# Patient Record
Sex: FEMALE | Race: WHITE | NOT HISPANIC OR LATINO | Employment: FULL TIME | URBAN - METROPOLITAN AREA
[De-identification: names, ages, dates, MRNs, and addresses within clinical notes are randomized per-mention and may not be internally consistent; named-entity substitution may affect disease eponyms.]

---

## 2021-07-12 ENCOUNTER — OFFICE VISIT (OUTPATIENT)
Dept: FAMILY MEDICINE | Facility: CLINIC | Age: 31
End: 2021-07-12
Payer: COMMERCIAL

## 2021-07-12 VITALS
HEIGHT: 66 IN | SYSTOLIC BLOOD PRESSURE: 130 MMHG | BODY MASS INDEX: 32.71 KG/M2 | OXYGEN SATURATION: 99 % | HEART RATE: 100 BPM | DIASTOLIC BLOOD PRESSURE: 85 MMHG | WEIGHT: 203.5 LBS | TEMPERATURE: 98 F

## 2021-07-12 DIAGNOSIS — Z13.6 SCREENING FOR HEART DISEASE: ICD-10-CM

## 2021-07-12 DIAGNOSIS — Z13.1 SCREENING FOR DIABETES MELLITUS: ICD-10-CM

## 2021-07-12 DIAGNOSIS — Z00.00 ANNUAL PHYSICAL EXAM: Primary | ICD-10-CM

## 2021-07-12 DIAGNOSIS — R53.83 OTHER FATIGUE: ICD-10-CM

## 2021-07-12 DIAGNOSIS — Z12.4 SCREENING FOR MALIGNANT NEOPLASM OF CERVIX: ICD-10-CM

## 2021-07-12 DIAGNOSIS — R63.5 WEIGHT GAIN: ICD-10-CM

## 2021-07-12 PROCEDURE — 99385 PREV VISIT NEW AGE 18-39: CPT | Performed by: NURSE PRACTITIONER

## 2021-07-12 PROCEDURE — G0145 SCR C/V CYTO,THINLAYER,RESCR: HCPCS | Performed by: NURSE PRACTITIONER

## 2021-07-12 PROCEDURE — 87624 HPV HI-RISK TYP POOLED RSLT: CPT | Performed by: NURSE PRACTITIONER

## 2021-07-12 RX ORDER — VENLAFAXINE HYDROCHLORIDE 150 MG/1
150 TABLET, EXTENDED RELEASE ORAL DAILY
COMMUNITY
End: 2022-02-11

## 2021-07-12 RX ORDER — VENLAFAXINE HYDROCHLORIDE 37.5 MG/1
37.5 CAPSULE, EXTENDED RELEASE ORAL PRN
COMMUNITY
Start: 2021-07-07 | End: 2022-02-11

## 2021-07-12 RX ORDER — CLONAZEPAM 0.5 MG/1
0.5 TABLET ORAL 2 TIMES DAILY PRN
COMMUNITY

## 2021-07-12 RX ORDER — CETIRIZINE HYDROCHLORIDE 10 MG/1
10 TABLET ORAL DAILY
COMMUNITY
End: 2022-02-11

## 2021-07-12 ASSESSMENT — PAIN SCALES - GENERAL: PAINLEVEL: NO PAIN (0)

## 2021-07-12 ASSESSMENT — ANXIETY QUESTIONNAIRES
IF YOU CHECKED OFF ANY PROBLEMS ON THIS QUESTIONNAIRE, HOW DIFFICULT HAVE THESE PROBLEMS MADE IT FOR YOU TO DO YOUR WORK, TAKE CARE OF THINGS AT HOME, OR GET ALONG WITH OTHER PEOPLE: SOMEWHAT DIFFICULT
GAD7 TOTAL SCORE: 2
6. BECOMING EASILY ANNOYED OR IRRITABLE: NOT AT ALL
3. WORRYING TOO MUCH ABOUT DIFFERENT THINGS: NOT AT ALL
2. NOT BEING ABLE TO STOP OR CONTROL WORRYING: NOT AT ALL
7. FEELING AFRAID AS IF SOMETHING AWFUL MIGHT HAPPEN: NOT AT ALL
5. BEING SO RESTLESS THAT IT IS HARD TO SIT STILL: NOT AT ALL
1. FEELING NERVOUS, ANXIOUS, OR ON EDGE: SEVERAL DAYS

## 2021-07-12 ASSESSMENT — MIFFLIN-ST. JEOR: SCORE: 1664.82

## 2021-07-12 ASSESSMENT — PATIENT HEALTH QUESTIONNAIRE - PHQ9
5. POOR APPETITE OR OVEREATING: SEVERAL DAYS
SUM OF ALL RESPONSES TO PHQ QUESTIONS 1-9: 0

## 2021-07-12 NOTE — PATIENT INSTRUCTIONS
First, call [phone] to schedule your fasting lab appointment. Next, call [phone] to schedule your follow up virtual appt with nikolay to discuss your results and to discuss fatigue, weight gain and rash.   [facility] Medication Refill Request Information:  * Please contact your pharmacy regarding ANY request for medication refills.  ** NP Clinic Prescription Fax = [phone]  * Please allow 3 business days for routine medication refills.  * Please allow 5 business days for controlled substance medication refills.       [facility] Test Result notification information:  *You will be notified with in 7-10 days of your appointment day regarding the results of your test.  If you are on MyChart you will be notified as soon as the provider has reviewed the results and signed off on them.    [facility]: [phone]     If you have questions regarding Covid-19 and the Covid-19 vaccine, please visit this website.    [email or web address]

## 2021-07-12 NOTE — PROGRESS NOTES
HPI       Perla Garcia is a 30 year old female who presents for the new concern(s) of:  Chief Complaint   Patient presents with     Physical     Pt is here for a physical.     Perla Garcia is a 30 y.o. female with a history of depression, anxiety with panic attacks, and impetigo who presents to establish care and an annual preventative visit. She works as a [employer] teacher. She is  to her male spouse, in a monogamous relationship- no concern for STDs. No tobacco or illicit drug use, occasional alcohol use with no binge drinking.    Problem, Medication and Allergy Lists were reviewed.     Patient Active Problem List    Diagnosis Date Noted     Impetigo      Priority: Medium     Created by Conversion            Current Outpatient Medications   Medication Sig Dispense Refill     cetirizine (ZYRTEC) 10 MG tablet Take 10 mg by mouth daily       clonazePAM (KLONOPIN) 0.5 MG tablet Take 0.5 mg by mouth 2 times daily as needed for anxiety       venlafaxine (EFFEXOR-ER) 150 MG 24 hr tablet Take 150 mg by mouth daily       venlafaxine (EFFEXOR-XR) 37.5 MG 24 hr capsule Take 37.5 mg by mouth as needed for anxiety       levonorgestrel (MIRENA) 20 MCG/24HR IUD 1 each by Intrauterine route Liletta IUD from [facility]       Allergies   Allergen Reactions     Amoxicillin Unknown     Penicillins Unknown     Latex Rash     Patient is   a new patient to this clinic and so  I reviewed/updated the Past Medical History, the Family History and the Social History   Past Medical History:   Diagnosis Date     Anxiety      Depressive disorder      Family History   Problem Relation Age of Onset     No Known Problems Mother      No Known Problems Father      Cancer Paternal Grandmother      Lung Cancer Paternal Grandmother      Cancer Paternal Grandfather      Skin Cancer Paternal Grandfather      Heart Failure Paternal Grandfather      Social History     Socioeconomic History     Marital status: Single      Spouse name: None     Number of children: None     Years of education: None     Highest education level: None   Occupational History     None   Tobacco Use     Smoking status: Never Smoker     Smokeless tobacco: Never Used   Vaping Use     Vaping Use: Never used   Substance and Sexual Activity     Alcohol use: Yes     Comment: Less than once per week     Drug use: Never     Sexual activity: Yes     Partners: Male     Birth control/protection: I.U.D.   Other Topics Concern     None   Social History Narrative     None     Social Determinants of Health     Financial Resource Strain:      Difficulty of Paying Living Expenses:    Food Insecurity:      Worried About Running Out of Food in the Last Year:      Ran Out of Food in the Last Year:    Transportation Needs:      Lack of Transportation (Medical):      Lack of Transportation (Non-Medical):    Physical Activity:      Days of Exercise per Week:      Minutes of Exercise per Session:    Stress:      Feeling of Stress :    Social Connections:      Frequency of Communication with Friends and Family:      Frequency of Social Gatherings with Friends and Family:      Attends Christian Services:      Active Member of Clubs or Organizations:      Attends Club or Organization Meetings:      Marital Status:    Intimate Partner Violence:      Fear of Current or Ex-Partner:      Emotionally Abused:      Physically Abused:      Sexually Abused:    .         Review of Systems:   Skin: positive for rash inferior to umbilicus.  Eyes: negative for visual blurring, eye pain, redness  Ears/Nose/Throat: negative for nasal congestion, vertigo, frequent URI's  Respiratory: No shortness of breath, dyspnea on exertion, cough, or hemoptysis  Cardiovascular: negative for palpitations, tachycardia and chest pain  Gastrointestinal: positive for heartburn, constipation and food intolerances/sensitivities (gluten, dairy).  Genitourinary: negative for dysuria, frequency and  "urgency  Musculoskeletal: negative for joint pain, joint swelling and joint stiffness  Neurologic: negative for headaches, local weakness, numbness or tingling of hands or feet  Psychiatric: positive for anxiety and depression stable; negative for, excessive stress and sleep disturbance  Hematologic/Lymphatic/Immunologic: negative for anemia, chills and fever  Endocrine: negative for thyroid disorder, cold or heat intolerance and night sweats            Physical Exam:     Vitals:    07/12/21 1325   BP: 130/85   Pulse: 100   Temp: 98  F (36.7  C)   SpO2: 99%   Weight: 92.3 kg (203 lb 8 oz)   Height: 1.676 m (5' 6\")     Body mass index is 32.85 kg/m .  Vitals were reviewed and were normal except slightly elevated blood pressure, elevated BMI.     Physical Exam  Constitutional: healthy, alert and no distress  Head: Normocephalic. No masses, lesions, tenderness or abnormalities  Neck: Neck supple. No adenopathy. Thyroid symmetric, normal size.  ENT: ENT exam normal, no neck nodes or sinus tenderness  Cardiovascular: RRR. No murmurs, clicks gallops or rub  Respiratory: Good diaphragmatic excursion. Lung fields clear upon auscultation.  Gastrointestinal: Abdomen soft, non-tender. BS normal. No masses, organomegaly.  : Pelvic exam revealed normal female external genitalia, urethra, and vagina with skin intact and no lesions noted. Internal exam revealed vaginal vault free of bleeding and discharge. Cervix is well-visualized with IUD strings visualized, initially covered with thick white/clear mucous. Pap imaged thin layer was obtained along with reflex screening for HPV DNA test if positive ASCUS.   Musculoskeletal: extremities normal- no gross deformities noted, gait normal and normal muscle tone  Skin: Rash inferior to umbilicus- reddened, slightly elevated, irregular patter, appears to be related to contact.  Neurologic: Gait normal. Reflexes normal and symmetric. Sensation grossly WNL.  Psychiatric: mentation appears " normal and affect normal/bright  Hematologic/Lymphatic/Immunologic: Normal cervical lymph nodes      Results:     Labs pending.    Assessment and Plan     Perla was seen today for physical.    Diagnoses and all orders for this visit:    Annual physical exam  -     Pap imaged thin layer screen reflex to HPV if ASCUS - recommend age 25 - 29; Future  -     Pap imaged thin layer screen reflex to HPV if ASCUS - recommend age 25 - 29    Screening for malignant neoplasm of cervix    Screening for heart disease  -     Lipid Profile FASTING; Future    Screening for diabetes mellitus  -     Glucose; Future    Other fatigue  -     TSH; Future    Weight gain  -     TSH; Future    1. Immunizations up-to-date. COVID-19 vaccination completed.  2. Labs for future, when fasting.  3. Pap completed today, results pending.  4. Liletta IUD placed in 2016- duration of use is six year, plan to remove in one year and discuss patient's options for contraception as wanted.  5. Follow-up in one week via phone or video with Melony GRAHAM, ESAU to discuss labs and discuss abdominal rash, fatigue, and digestion-related issues ie constipation, heartburn, weight gain, food sensitivities to gluten and dairy.    There are no discontinued medications.    Options for treatment and follow-up care were reviewed with the patient. Perla Garcia  engaged in the decision making process and verbalized understanding of the options discussed and agreed with the final plan.    Delbert Montalvo DNP Student, July 12, 2021 4:20 PM  I was present with the NP student who participated in the service and in the documentation of the services provided. I have verified the history and personally performed the physical exam and medical decision making, as documented by the student and edited by me.  GLADYS Caro, CNP

## 2021-07-13 ASSESSMENT — ANXIETY QUESTIONNAIRES: GAD7 TOTAL SCORE: 2

## 2021-07-14 LAB
BKR LAB AP GYN ADEQUACY: NORMAL
BKR LAB AP GYN INTERPRETATION: NORMAL
BKR LAB AP HPV REFLEX: NORMAL
BKR LAB AP PREVIOUS ABNORMAL: NORMAL
PATH REPORT.COMMENTS IMP SPEC: NORMAL
PATH REPORT.RELEVANT HX SPEC: NORMAL

## 2021-07-16 LAB
HUMAN PAPILLOMA VIRUS 16 DNA: NEGATIVE
HUMAN PAPILLOMA VIRUS 18 DNA: NEGATIVE
HUMAN PAPILLOMA VIRUS FINAL DIAGNOSIS: NORMAL
HUMAN PAPILLOMA VIRUS OTHER HR: NEGATIVE

## 2021-07-22 ENCOUNTER — LAB (OUTPATIENT)
Dept: LAB | Facility: CLINIC | Age: 31
End: 2021-07-22
Payer: COMMERCIAL

## 2021-07-22 ENCOUNTER — DOCUMENTATION ONLY (OUTPATIENT)
Dept: LAB | Facility: CLINIC | Age: 31
End: 2021-07-22

## 2021-07-22 DIAGNOSIS — Z13.6 SCREENING FOR HEART DISEASE: ICD-10-CM

## 2021-07-22 DIAGNOSIS — Z13.1 SCREENING FOR DIABETES MELLITUS: ICD-10-CM

## 2021-07-22 DIAGNOSIS — R63.5 WEIGHT GAIN: ICD-10-CM

## 2021-07-22 DIAGNOSIS — R53.83 OTHER FATIGUE: ICD-10-CM

## 2021-07-22 LAB
CHOLEST SERPL-MCNC: 188 MG/DL
FASTING STATUS PATIENT QL REPORTED: YES
FASTING STATUS PATIENT QL REPORTED: YES
GLUCOSE BLD-MCNC: 84 MG/DL (ref 70–99)
HDLC SERPL-MCNC: 57 MG/DL
LDLC SERPL CALC-MCNC: 114 MG/DL
NONHDLC SERPL-MCNC: 131 MG/DL
TRIGL SERPL-MCNC: 87 MG/DL
TSH SERPL DL<=0.005 MIU/L-ACNC: 1.26 MU/L (ref 0.4–4)

## 2021-07-22 PROCEDURE — 82947 ASSAY GLUCOSE BLOOD QUANT: CPT | Performed by: PATHOLOGY

## 2021-07-22 PROCEDURE — 80061 LIPID PANEL: CPT | Performed by: PATHOLOGY

## 2021-07-22 PROCEDURE — 84443 ASSAY THYROID STIM HORMONE: CPT | Performed by: PATHOLOGY

## 2021-07-22 PROCEDURE — 36415 COLL VENOUS BLD VENIPUNCTURE: CPT | Performed by: PATHOLOGY

## 2021-07-22 NOTE — PROGRESS NOTES
Rapid Response Epic Documentation     Situation:   Female Pt. Who was in lab for a fasting blood draw c/o near syncope following the procedure. Pt denies LOC.     Objective:    Pt was found lying supine in the recliner, Con and Alert Ox3, skin was pale and diaphoretic that improved to PWD. Pt has no complaints on my arrival.    Assessment:            BP:  112/77  Pulse: 80  Respiration: 16  SPO2:99 %  Glucose:   mg/dl  Mental Status: Alert  CMS: Intact  Stroke Scale: Not Applicable  EKG: Not Performed      Treatment:          Location:      1st floor lab     Disposition:      Stable (D/C home)    Protocol Used:     Other weakness

## 2021-07-26 ENCOUNTER — VIRTUAL VISIT (OUTPATIENT)
Dept: FAMILY MEDICINE | Facility: CLINIC | Age: 31
End: 2021-07-26
Payer: COMMERCIAL

## 2021-07-26 DIAGNOSIS — Z30.09 COUNSELING FOR BIRTH CONTROL, INTRAUTERINE DEVICE: ICD-10-CM

## 2021-07-26 DIAGNOSIS — F32.1 MODERATE MAJOR DEPRESSION (H): ICD-10-CM

## 2021-07-26 DIAGNOSIS — E78.5 HYPERLIPIDEMIA LDL GOAL <100: Primary | ICD-10-CM

## 2021-07-26 DIAGNOSIS — L01.00 IMPETIGO: ICD-10-CM

## 2021-07-26 PROCEDURE — 99213 OFFICE O/P EST LOW 20 MIN: CPT | Mod: GT | Performed by: NURSE PRACTITIONER

## 2021-07-26 ASSESSMENT — PAIN SCALES - GENERAL: PAINLEVEL: NO PAIN (0)

## 2021-07-26 NOTE — PROGRESS NOTES
Perla is a 30 year old who is being evaluated via a billable video visit.      How would you like to obtain your AVS? ID [email or web address]  If the video visit is dropped, the invitation should be resent by: Other e-mail: [email or web address]  Will anyone else be joining your video visit? No      Video Start Time:1515    Subjective   Perla is a 30 year old who presents for follow up on recent lab results.     HPI   Hyperlipidemia: LDL elevated at 114.  Impetigo:Seems to have resolved.   IUD:Placed January 2016. Needs to be changed out in 2022.   Depression and Anxiety/Panic: Works with psychiatrist and therapist on a regular basis. She takes Effexor daily, Klonopin as needed. Klonopin used once per month for panic type symptoms.      Review of Systems   Constitutional, HEENT, cardiovascular, pulmonary, gi and gu systems are negative, except as otherwise noted.      Objective         Vitals:  No vitals were obtained today due to virtual visit.    Physical Exam   GENERAL: Healthy, alert and no distress  EYES: Eyes grossly normal to inspection.  No discharge or erythema, or obvious scleral/conjunctival abnormalities.  RESP: No audible wheeze, cough, or visible cyanosis.  No visible retractions or increased work of breathing.    SKIN: Visible skin clear. No significant rash, abnormal pigmentation or lesions.  NEURO: Cranial nerves grossly intact.  Mentation and speech appropriate for age.  PSYCH: Mentation appears normal, affect normal/bright, judgement and insight intact, normal speech and appearance well-groomed.    Recent labs completed:  Component      Latest Ref Rng & Units 7/22/2021 7/22/2021          10:26 AM 10:26 AM   Cholesterol      <200 mg/dL  188   Triglycerides      <150 mg/dL  87   HDL Cholesterol      >=50 mg/dL  57   LDL Cholesterol Calculated      <=100 mg/dL  114 (H)   Non HDL Cholesterol      <130 mg/dL  131 (H)   Patient Fasting > 8hrs?       Yes Yes   Other HR HPV           HPV 16 DNA           HPV 18 DNA            Final Diagnosis           Glucose      70 - 99 mg/dL 84    TSH      0.40 - 4.00 mU/L 1.26      Component      Latest Ref Rng & Units 7/12/2021   Other HR HPV       Negative   HPV 16 DNA       Negative   HPV 18 DNA       Negative   Final Diagnosis       This result contains rich text formatting which cannot be displayed here.     Video-Visit Details    Type of service:  Video Visit    Video End Time:1530    Originating Location (pt. Location): Home    Distant Location (provider location):  [facility] [place]     Platform used for Video Visit: Well   Assessment & Plan     Hyperlipidemia LDL goal <100    Impetigo      Counseling for birth control, intrauterine device    - Ob/Gyn Referral    Moderate major depression    Return if symptoms worsen or fail to improve.  First, please work on 5 HH, increase level of activity. Next, please see OB gyn January 2022 for IUD removal and replacement. Next,continue on ordered medications for depression and anxiety. She verbalizes understanding of the plan.    Melony Mccray, APRN, CNP

## 2021-07-26 NOTE — PATIENT INSTRUCTIONS
[facility] Medication Refill Request Information:  * Please contact your pharmacy regarding ANY request for medication refills.  ** NP Clinic Prescription Fax = [phone]  * Please allow 3 business days for routine medication refills.  * Please allow 5 business days for controlled substance medication refills.     [facility] Test Result notification information:  *You will be notified with in 7-10 days of your appointment day regarding the results of your test.  If you are on MyChart you will be notified as soon as the provider has reviewed the results and signed off on them.    [facility]: [phone]     If you have questions regarding Covid-19 and the Covid-19 vaccine, please visit this website.    [email or web address]

## 2021-09-29 ASSESSMENT — ANXIETY QUESTIONNAIRES
1. FEELING NERVOUS, ANXIOUS, OR ON EDGE: MORE THAN HALF THE DAYS
6. BECOMING EASILY ANNOYED OR IRRITABLE: SEVERAL DAYS
7. FEELING AFRAID AS IF SOMETHING AWFUL MIGHT HAPPEN: NOT AT ALL
7. FEELING AFRAID AS IF SOMETHING AWFUL MIGHT HAPPEN: NOT AT ALL
3. WORRYING TOO MUCH ABOUT DIFFERENT THINGS: SEVERAL DAYS
2. NOT BEING ABLE TO STOP OR CONTROL WORRYING: SEVERAL DAYS
4. TROUBLE RELAXING: SEVERAL DAYS
8. IF YOU CHECKED OFF ANY PROBLEMS, HOW DIFFICULT HAVE THESE MADE IT FOR YOU TO DO YOUR WORK, TAKE CARE OF THINGS AT HOME, OR GET ALONG WITH OTHER PEOPLE?: SOMEWHAT DIFFICULT
GAD7 TOTAL SCORE: 6
GAD7 TOTAL SCORE: 6
5. BEING SO RESTLESS THAT IT IS HARD TO SIT STILL: NOT AT ALL
GAD7 TOTAL SCORE: 6

## 2021-09-29 ASSESSMENT — ENCOUNTER SYMPTOMS
DEPRESSION: 1
PANIC: 1
BLOATING: 1
INSOMNIA: 1
CONSTIPATION: 1
HEARTBURN: 1
NERVOUS/ANXIOUS: 1

## 2021-10-05 ENCOUNTER — OFFICE VISIT (OUTPATIENT)
Dept: OBGYN | Facility: CLINIC | Age: 31
End: 2021-10-05
Attending: ADVANCED PRACTICE MIDWIFE
Payer: COMMERCIAL

## 2021-10-05 VITALS
WEIGHT: 205 LBS | BODY MASS INDEX: 32.95 KG/M2 | DIASTOLIC BLOOD PRESSURE: 87 MMHG | HEART RATE: 86 BPM | HEIGHT: 66 IN | SYSTOLIC BLOOD PRESSURE: 126 MMHG

## 2021-10-05 DIAGNOSIS — Z30.432 ENCOUNTER FOR REMOVAL OF INTRAUTERINE CONTRACEPTIVE DEVICE: Primary | ICD-10-CM

## 2021-10-05 DIAGNOSIS — Z12.4 CERVICAL CANCER SCREENING: ICD-10-CM

## 2021-10-05 PROCEDURE — G0463 HOSPITAL OUTPT CLINIC VISIT: HCPCS

## 2021-10-05 PROCEDURE — 58301 REMOVE INTRAUTERINE DEVICE: CPT | Performed by: ADVANCED PRACTICE MIDWIFE

## 2021-10-05 ASSESSMENT — ANXIETY QUESTIONNAIRES
3. WORRYING TOO MUCH ABOUT DIFFERENT THINGS: SEVERAL DAYS
5. BEING SO RESTLESS THAT IT IS HARD TO SIT STILL: NOT AT ALL
1. FEELING NERVOUS, ANXIOUS, OR ON EDGE: MORE THAN HALF THE DAYS
GAD7 TOTAL SCORE: 5
7. FEELING AFRAID AS IF SOMETHING AWFUL MIGHT HAPPEN: NOT AT ALL
6. BECOMING EASILY ANNOYED OR IRRITABLE: NOT AT ALL
2. NOT BEING ABLE TO STOP OR CONTROL WORRYING: SEVERAL DAYS

## 2021-10-05 ASSESSMENT — MIFFLIN-ST. JEOR: SCORE: 1666.62

## 2021-10-05 ASSESSMENT — PATIENT HEALTH QUESTIONNAIRE - PHQ9
SUM OF ALL RESPONSES TO PHQ QUESTIONS 1-9: 1
5. POOR APPETITE OR OVEREATING: SEVERAL DAYS

## 2021-10-05 ASSESSMENT — PAIN SCALES - GENERAL: PAINLEVEL: NO PAIN (0)

## 2021-10-05 NOTE — LETTER
10/5/2021       RE: Perla Singh  [address]  [facility] [address]     Dear Colleague,    Thank you for referring your patient, Perla Singh, to the [facility] [place] at [facility]. Please see a copy of my visit note below.      IUD Removal:  SUBJECTIVE:    Is a pregnancy test required: No.  Was a consent obtained?  Yes    Perla Singh is a 31 year old female,, No LMP recorded. (Menstrual status: IUD). who presents today for IUD removal. Her current IUD was placed in 2016. She has not had problems with the IUD. She requests removal of the IUD because she desires to conceive    Today's PHQ-2 Score:   PHQ-2 (  [employer]) 10/5/2021   Q1: Little interest or pleasure in doing things 0   Q2: Feeling down, depressed or hopeless 0   PHQ-2 Score 0       PROCEDURE:    A speculum exam was performed and the cervix was visualized. The IUD string was visualized. Using ring forceps, the string  was grasped and the IUD removed intact.    POST PROCEDURE:    The patient tolerated the procedure well. Patient was discharged in stable condition.    Pregnancy counseling given, including folic acid supplementation 800-1000 mg per day. and Medications reviewed in anticipation of pregnancy    GLADYS Alfredo CNM  Answers for HPI/ROS submitted by the patient on 2021  CECILE 7 TOTAL SCORE: 6  General Symptoms: No  Skin Symptoms: No  HENT Symptoms: No  EYE SYMPTOMS: No  HEART SYMPTOMS: No  LUNG SYMPTOMS: No  INTESTINAL SYMPTOMS: Yes  URINARY SYMPTOMS: No  GYNECOLOGIC SYMPTOMS: No  BREAST SYMPTOMS: No  SKELETAL SYMPTOMS: No  BLOOD SYMPTOMS: No  NERVOUS SYSTEM SYMPTOMS: No  MENTAL HEALTH SYMPTOMS: Yes  Heart burn or indigestion: Yes  Bloating: Yes  Constipation: Yes  Nervous or Anxious: Yes  Depression: Yes  Trouble sleeping: Yes  Panic attacks: Yes          Again, thank you for allowing me to participate in the care of your  patient.      Sincerely,    GLADYS AlfredoM

## 2021-10-05 NOTE — NURSING NOTE
Chief Complaint   Patient presents with     Establish Care     IUD removal , pre-conception consult   Merari Goodman LPN

## 2021-10-05 NOTE — LETTER
Date:December 22, 2021      Patient was self referred, no letter generated. Do not send.        [facility] Health Information

## 2021-10-05 NOTE — PROGRESS NOTES
IUD Removal:  SUBJECTIVE:    Is a pregnancy test required: No.  Was a consent obtained?  Yes    Perla Singh is a 31 year old female,, No LMP recorded. (Menstrual status: IUD). who presents today for IUD removal. Her current IUD was placed in 2016. She has not had problems with the IUD. She requests removal of the IUD because she desires to conceive    Today's PHQ-2 Score:   PHQ-2 (  [employer]) 10/5/2021   Q1: Little interest or pleasure in doing things 0   Q2: Feeling down, depressed or hopeless 0   PHQ-2 Score 0       PROCEDURE:    A speculum exam was performed and the cervix was visualized. The IUD string was visualized. Using ring forceps, the string  was grasped and the IUD removed intact.    POST PROCEDURE:    The patient tolerated the procedure well. Patient was discharged in stable condition.    Pregnancy counseling given, including folic acid supplementation 800-1000 mg per day. and Medications reviewed in anticipation of pregnancy    GLADYS Alfredo CNM  Answers for HPI/ROS submitted by the patient on 2021  CECILE 7 TOTAL SCORE: 6  General Symptoms: No  Skin Symptoms: No  HENT Symptoms: No  EYE SYMPTOMS: No  HEART SYMPTOMS: No  LUNG SYMPTOMS: No  INTESTINAL SYMPTOMS: Yes  URINARY SYMPTOMS: No  GYNECOLOGIC SYMPTOMS: No  BREAST SYMPTOMS: No  SKELETAL SYMPTOMS: No  BLOOD SYMPTOMS: No  NERVOUS SYSTEM SYMPTOMS: No  MENTAL HEALTH SYMPTOMS: Yes  Heart burn or indigestion: Yes  Bloating: Yes  Constipation: Yes  Nervous or Anxious: Yes  Depression: Yes  Trouble sleeping: Yes  Panic attacks: Yes

## 2021-10-21 ENCOUNTER — VIRTUAL VISIT (OUTPATIENT)
Dept: PHARMACY | Facility: CLINIC | Age: 31
End: 2021-10-21
Payer: COMMERCIAL

## 2021-10-21 DIAGNOSIS — F33.42 RECURRENT MAJOR DEPRESSION IN FULL REMISSION (H): ICD-10-CM

## 2021-10-21 DIAGNOSIS — Z31.69 ENCOUNTER FOR PRECONCEPTION CONSULTATION: Primary | ICD-10-CM

## 2021-10-21 PROCEDURE — 99207 PR NO CHARGE LOS: CPT | Performed by: PHARMACIST

## 2021-10-21 NOTE — PROGRESS NOTES
Clinical Pharmacy Consult:                                                    Perla Singh is a 31 year old female called for a clinical pharmacist consult.  She was referred to me from Malini Herring CNM.    Reason for Consult: Questions about medications in pregnancy, pre-conception planning    Discussion:     Allergies: Patient takes zyrtec 10 mg daily year round. She has tried other antihistamines, but has found this one to be the most effective.    Anxiety: Currently on 187.5 mg venlafaxine daily and is well controlled. 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 Has been on venlafaxine for years. Previously on escitalopram in college/early adulthood and was well controlled - decided to come off of it because she felt like she didn't need it at the time. She uses clonazepam 0.5mg as needed approximately once a month for panic attacks.    Other: Patient started a prenatal vitamin and also takes a daily probiotic. 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 She has had impetigo in the past for which she has used bactroban topically. She has had 2 doses of the COVID vaccine + a booster and her influenza vaccine this year.    Plan:  1. Cetirizine is safe to continue to take in pregnancy and has good safety data. Flonase is also safe to continue as needed.    2. Good control of anxiety and depression in pregnancy is very important. Venlafaxine can be continued in pregnancy, especially if it is helping maintain good anxiety control. Risks associated with venlafaxine in pregnancy include withdrawal symptoms in the baby such as agitation, fussiness, trouble feeding, and problems with temperature control, however these symptoms are usually temporary. There is also a risk of increased blood pressure in the blood vessels around the lungs which can be a serious condition. Options moving forward include staying on venlafaxine or potentially  switching to another similar medication (like escitalopram) that show lower risk of withdrawal in the baby. We discourage stopping medication for mental health due to increased risk of post-partum depression and complications in pregnancy associated with uncontrolled anxiety and depression. Continuing to use clonazepam infrequently and at a low dose has a very low risk for dependence to develop in the baby.    3. Continue to take the prenatal vitamin and it is safe to continue the probiotic as well. Bactroban ointment is okay to use as well if needed for impetigo.    Martina Yi.D. Student    I was present with the student during the assessment and I approve of the above plan and documentation.      Carolyn Ram Pharm.D., FCCP, BCPS

## 2021-10-22 NOTE — PATIENT INSTRUCTIONS
1. Cetirizine is safe to continue to take in pregnancy and has good safety data. Flonase is also safe to continue as needed.     2. Good control of anxiety and depression in pregnancy is very important. Venlafaxine can be continued in pregnancy, especially if it is helping maintain good anxiety control. Risks associated with venlafaxine in pregnancy include withdrawal symptoms in the baby such as agitation, fussiness, trouble feeding, and problems with temperature control, however these symptoms are usually temporary. There is also a risk of increased blood pressure in the blood vessels around the lungs which can be a serious condition. Options moving forward include staying on venlafaxine or potentially switching to another similar medication (like escitalopram) that show lower risk of withdrawal in the baby. We discourage stopping medication for mental health due to increased risk of post-partum depression and complications in pregnancy associated with uncontrolled anxiety and depression. Continuing to use clonazepam infrequently and at a low dose has a very low risk for dependence to develop in the baby.     3. Continue to take the prenatal vitamin and it is safe to continue the probiotic as well. Bactroban ointment is okay to use as well if needed for impetigo.

## 2022-02-09 ASSESSMENT — ANXIETY QUESTIONNAIRES
4. TROUBLE RELAXING: SEVERAL DAYS
7. FEELING AFRAID AS IF SOMETHING AWFUL MIGHT HAPPEN: SEVERAL DAYS
3. WORRYING TOO MUCH ABOUT DIFFERENT THINGS: SEVERAL DAYS
5. BEING SO RESTLESS THAT IT IS HARD TO SIT STILL: NOT AT ALL
6. BECOMING EASILY ANNOYED OR IRRITABLE: MORE THAN HALF THE DAYS
GAD7 TOTAL SCORE: 7
7. FEELING AFRAID AS IF SOMETHING AWFUL MIGHT HAPPEN: SEVERAL DAYS
2. NOT BEING ABLE TO STOP OR CONTROL WORRYING: SEVERAL DAYS
1. FEELING NERVOUS, ANXIOUS, OR ON EDGE: SEVERAL DAYS

## 2022-02-10 ASSESSMENT — ANXIETY QUESTIONNAIRES: GAD7 TOTAL SCORE: 7

## 2022-02-11 ENCOUNTER — OFFICE VISIT (OUTPATIENT)
Dept: FAMILY MEDICINE | Facility: CLINIC | Age: 32
End: 2022-02-11
Payer: COMMERCIAL

## 2022-02-11 VITALS
WEIGHT: 205 LBS | HEART RATE: 75 BPM | SYSTOLIC BLOOD PRESSURE: 121 MMHG | TEMPERATURE: 98 F | OXYGEN SATURATION: 98 % | HEIGHT: 66 IN | DIASTOLIC BLOOD PRESSURE: 86 MMHG | BODY MASS INDEX: 32.95 KG/M2

## 2022-02-11 DIAGNOSIS — R06.02 SHORTNESS OF BREATH: ICD-10-CM

## 2022-02-11 DIAGNOSIS — F41.1 GAD (GENERALIZED ANXIETY DISORDER): ICD-10-CM

## 2022-02-11 DIAGNOSIS — U09.9 POST-COVID CHRONIC DYSPNEA: Primary | ICD-10-CM

## 2022-02-11 DIAGNOSIS — F32.A DEPRESSION, UNSPECIFIED DEPRESSION TYPE: ICD-10-CM

## 2022-02-11 DIAGNOSIS — R06.09 POST-COVID CHRONIC DYSPNEA: Primary | ICD-10-CM

## 2022-02-11 PROCEDURE — 99213 OFFICE O/P EST LOW 20 MIN: CPT | Performed by: NURSE PRACTITIONER

## 2022-02-11 RX ORDER — ESCITALOPRAM OXALATE 10 MG/1
10 TABLET ORAL DAILY
COMMUNITY
Start: 2022-02-07 | End: 2023-02-07

## 2022-02-11 RX ORDER — ALBUTEROL SULFATE 90 UG/1
2 AEROSOL, METERED RESPIRATORY (INHALATION) 4 TIMES DAILY PRN
Qty: 18 G | Refills: 0 | Status: SHIPPED | OUTPATIENT
Start: 2022-02-11 | End: 2022-05-14

## 2022-02-11 ASSESSMENT — MIFFLIN-ST. JEOR: SCORE: 1666.62

## 2022-02-11 NOTE — PROGRESS NOTES
Perla Singh is a 31 year old female who presents today for establish care, depression, anxiety, post covid symptoms.     Depression/anxitey   Planning for pregnancy, decided with her MH provider (Margarito Sullivan MD)  to taper off Effexor. Feb 1st D/C'd completely off of Effexor- tapered over 1 month. Exchanged for Lexapro 10 mg by mouth daily. She also had COVID at that time of taper- so felt poor emotionally/mentally.     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 Symptoms- cough, fatigue, body aches, headaches, no taste or smell, chest pain. She is vaccinated and boosted.      States that she is still experiencing fatigue. She is a  and does have some increased stress that may be contributing.     She is still experiencing shortness of breath with exertion and night sweats and fatigue, and brain fog.     Review Of Systems  Skin: negative  Eyes: negative  Ears/Nose/Throat: negative  Respiratory: as above   Cardiovascular: negative  Gastrointestinal: negative  Genitourinary: negative  Musculoskeletal: negative  Neurologic: negative  Psychiatric: as above  Hematologic/Lymphatic/Immunologic: negative  Endocrine: negative    Past Medical History:   Diagnosis Date     Anxiety      Depressive disorder      Past Surgical History:   Procedure Laterality Date     [facility] DENTAL SURGERY PROCEDURE      Description: Oral Surgery Tooth Extraction;  Proc Date: 01/01/2010;  Comments: wisdom teeth     WISDOM TOOTH EXTRACTION       Social History     Socioeconomic History     Marital status:      Spouse name: Not on file     Number of children: Not on file     Years of education: Not on file     Highest education level: Not on file   Occupational History     Not on file   Tobacco Use     Smoking status: Never Smoker     Smokeless tobacco: Never Used   Vaping Use     Vaping Use:  "Never used   Substance and Sexual Activity     Alcohol use: Yes     Comment: Less than once per week     Drug use: Never     Sexual activity: Yes     Partners: Male     Birth control/protection: I.U.D.   Other Topics Concern     Not on file   Social History Narrative     Not on file     Social Determinants of Health     Financial Resource Strain: Not on file   Food Insecurity: Not on file   Transportation Needs: Not on file   Physical Activity: Not on file   Stress: Not on file   Social Connections: Not on file   Intimate Partner Violence: Not on file   Housing Stability: Not on file     Family History   Problem Relation Age of Onset     No Known Problems Mother      No Known Problems Father      Cancer Paternal Grandmother      Lung Cancer Paternal Grandmother      Cancer Paternal Grandfather      Skin Cancer Paternal Grandfather      Heart Failure Paternal Grandfather        /86   Pulse 75   Temp 98  F (36.7  C) (Oral)   Ht 1.676 m (5' 6\")   Wt 93 kg (205 lb)   SpO2 98%   BMI 33.09 kg/m      Exam:  Constitutional: healthy, alert and no distress  Head: Normocephalic. No masses, lesions, tenderness or abnormalities  Neck: Neck supple. No adenopathy. Thyroid symmetric, normal size,, Carotids without bruits.  ENT: ENT exam normal, no neck nodes or sinus tenderness  Cardiovascular: negative, PMI normal. No lifts, heaves, or thrills. RRR. No murmurs, clicks gallops or rub  Respiratory: negative, Percussion normal. Good diaphragmatic excursion. Lungs clear  Gastrointestinal: Abdomen soft, non-tender. BS normal. No masses, organomegaly  : Deferred  Musculoskeletal: extremities normal- no gross deformities noted, gait normal and normal muscle tone  Skin: no suspicious lesions or rashes  Neurologic: Gait normal. Reflexes normal and symmetric. Sensation grossly WNL.  Psychiatric: mentation appears normal and affect normal/bright  Hematologic/Lymphatic/Immunologic: Normal cervical lymph " nodes    Assessment/Plan:  (R06.09,  U09.9) Post-COVID chronic dyspnea  (primary encounter diagnosis)  Comment: Patient is still experiencing some of her covid symptoms. Reassured patient that it is not uncommon to still have residual symptoms from covid even after a month. Offered patient referral to post covid clinic, blood work. Patient declined for now. Will use albuterol inhaler for SOB.  Plan: Recheck in 3-4 weeks for continued symptoms. Consider labs at that time for TSH.     (R06.02) Shortness of breath  Plan: albuterol (PROAIR HFA/PROVENTIL HFA/VENTOLIN  HFA) 108 (90 Base) MCG/ACT inhaler           (F41.1) CECILE (generalized anxiety disorder)  Plan: Continue current treatment plan.     (F32.A) Depression, unspecified depression type  Plan: Continue current treatment plan.       GLADYS Mendenhall CNP

## 2022-02-11 NOTE — PATIENT INSTRUCTIONS
Patient Education     Follow up in 3-4 weeks if symptoms persist or fail to improve with use of albuterol inhaler. We can discuss blood tests and a referral to the post-covid clinic at that time if needed.   Use albuterol inhaler 4 times per day as needed for symptom management.      Albuterol HFA Inhaler 90 mcg/inh (200 actuations)  Uses  This medicine is used for the following purposes:    asthma attacks    prevent asthma attacks    asthma    chronic lung disease  Instructions  Use the medicine as needed if you experience wheezing or difficulty breathing.  Keep the medicine at room temperature. Avoid heat and direct light.  Ask your doctor, nurse or pharmacist to show you how to use this medicine correctly.  Each time you receive a new inhaler, you need to get it ready to use. Shake the inhaler for 5 seconds, point it away from you and then spray into the air. Repeat this 3 more times. This process is called priming the pump and removes any air from the medicine canister.  Shake the inhaler well for 5 seconds before each use.  If you are using more than one puff or more than one inhaled medicine, wait at least 1 minute between puffs.  Keep track of how many times you use the inhaler. Do not use it more than the total number of doses shown on the package.  Please tell your doctor and pharmacist about all the medicines you take. Include both prescription and over-the-counter medicines. Also tell them about any vitamins, herbal medicines, or anything else you take for your health.  Do not share this medicine with anyone who has not been prescribed this medicine.  It is very important that you keep all appointments for medical exams and tests while on this medicine.  Cautions  Do not use the medication any more than instructed.  If this medicine causes more wheezing or makes it harder to breathe, stop the medicine. Get medical help right away.  Tell the doctor or pharmacist if you are pregnant, planning to be  pregnant, or breastfeeding.  Do not start or stop any other medicines without first speaking to your doctor or pharmacist.  If you have any trouble using the medicine, please tell your doctor, nurse or pharmacist.  Side Effects  The following is a list of some common side effects from this medicine. Please speak with your doctor about what you should do if you experience these or other side effects.    agitated feeling or trouble sleeping    coughing    dizziness    dry mouth    headaches    hoarseness or throat irritation    high blood pressure    nausea    shakiness    changes in taste or unpleasant taste  Call your doctor or get medical help right away if you notice any of these more serious side effects:    rarely, a severe episode of asthma may occur    chest pain    confusion    fast or irregular heart beats    rapid breathing    worsening breathing symptoms  A few people may have an allergic reactions to this medicine. Symptoms can include difficulty breathing, skin rash, itching, swelling, or severe dizziness. If you notice any of these symptoms, seek medical help quickly.  Extra  Please speak with your doctor, nurse, or pharmacist if you have any questions about this medicine.  [email or web address]  IMPORTANT NOTE: This document tells you briefly how to take your medicine, but it does not tell you all there is to know about it.Your doctor or pharmacist may give you other documents about your medicine. Please talk to them if you have any questions.Always follow their advice. There is a more complete description of this medicine available in English.Scan this code on your smartphone or tablet or use the web address below. You can also ask your pharmacist for a printout. If you have any questions, please ask your pharmacist.     2021 [employer].

## 2022-02-11 NOTE — NURSING NOTE
Chief Complaint   Patient presents with     Establish Care     Respiratory Problems     fatigue, sob, night sweats, brain fog, chills- lingering symptoms     Melody WYLIE MA 8:25 AM 2/11/2022

## 2022-05-14 ENCOUNTER — OFFICE VISIT (OUTPATIENT)
Dept: URGENT CARE | Facility: URGENT CARE | Age: 32
End: 2022-05-14
Payer: COMMERCIAL

## 2022-05-14 VITALS
DIASTOLIC BLOOD PRESSURE: 79 MMHG | TEMPERATURE: 97.7 F | RESPIRATION RATE: 16 BRPM | WEIGHT: 190 LBS | HEART RATE: 72 BPM | BODY MASS INDEX: 30.67 KG/M2 | SYSTOLIC BLOOD PRESSURE: 128 MMHG | OXYGEN SATURATION: 99 %

## 2022-05-14 DIAGNOSIS — S69.91XA HAND TRAUMA, RIGHT, INITIAL ENCOUNTER: Primary | ICD-10-CM

## 2022-05-14 PROCEDURE — 99213 OFFICE O/P EST LOW 20 MIN: CPT | Performed by: PHYSICIAN ASSISTANT

## 2022-05-14 RX ORDER — BACITRACIN ZINC 500 [USP'U]/G
OINTMENT TOPICAL 2 TIMES DAILY
Qty: 30 G | Refills: 1 | Status: SHIPPED | OUTPATIENT
Start: 2022-05-14
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
## 2022-07-19 ENCOUNTER — E-VISIT (OUTPATIENT)
Dept: FAMILY MEDICINE | Facility: CLINIC | Age: 32
End: 2022-07-19
Payer: COMMERCIAL

## 2022-07-19 DIAGNOSIS — L01.00 IMPETIGO: Primary | ICD-10-CM

## 2022-07-19 PROCEDURE — 99421 OL DIG E/M SVC 5-10 MIN: CPT | Performed by: PHYSICIAN ASSISTANT

## 2022-07-19 RX ORDER — MUPIROCIN 20 MG/G
OINTMENT TOPICAL 3 TIMES DAILY
Qty: 30 G | Refills: 0 | Status: SHIPPED | OUTPATIENT
Start: 2022-07-19 | End: 2022-07-26

## 2022-07-19 NOTE — PATIENT INSTRUCTIONS
Dear Perla Singh    It appears you have impetigo from the picture uploaded. I prescribed Mupirocin ointment. If symptoms do not improvin in 7 days please make an appointment to be seen.    Thanks for choosing us as your health care partner,    Lakeisha Doan PA-C

## 2022-10-01 ENCOUNTER — HEALTH MAINTENANCE LETTER (OUTPATIENT)
Age: 32
End: 2022-10-01

## 2023-10-21 ENCOUNTER — HEALTH MAINTENANCE LETTER (OUTPATIENT)
Age: 33
End: 2023-10-21

## 2024-11-04 ASSESSMENT — ANXIETY QUESTIONNAIRES: GAD7 TOTAL SCORE: 5

## 2024-12-08 ENCOUNTER — HEALTH MAINTENANCE LETTER (OUTPATIENT)
Age: 34
End: 2024-12-08